# Patient Record
(demographics unavailable — no encounter records)

---

## 2025-07-07 NOTE — HEALTH RISK ASSESSMENT
[Little interest or pleasure doing things] : 1) Little interest or pleasure doing things [Feeling down, depressed, or hopeless] : 2) Feeling down, depressed, or hopeless [0] : 2) Feeling down, depressed, or hopeless: Not at all (0) [PHQ-9 Negative - No further assessment needed] : PHQ-9 Negative - No further assessment needed [Never] : Never [[id number]] : 0

## 2025-07-07 NOTE — END OF VISIT
[] : Resident [FreeTextEntry3] : 6/23 sought  for insect bite -- admitted to [facility] for olecranon bursitis and cellulitis 6/26-6/27. Still having pain when lifting over his head. He hopes to go on a golf trip this weekend. PO cefadroxil hasn't helped since discharge has been ineffective. Will do doxycycline and recommend strict sunscreen use. I recommend strictly against sports while his tendon and bursae are healing.

## 2025-07-07 NOTE — PHYSICAL EXAM
[No Acute Distress] : no acute distress [Well Nourished] : well nourished [Well Developed] : well developed [Well-Appearing] : well-appearing [PERRL] : pupils equal round and reactive to light [EOMI] : extraocular movements intact [Normal Oropharynx] : the oropharynx was normal [No Lymphadenopathy] : no lymphadenopathy [Supple] : supple [No Respiratory Distress] : no respiratory distress  [No Accessory Muscle Use] : no accessory muscle use [Clear to Auscultation] : lungs were clear to auscultation bilaterally [Normal Rate] : normal rate  [Regular Rhythm] : with a regular rhythm [Normal S1, S2] : normal S1 and S2 [No Edema] : there was no peripheral edema [Soft] : abdomen soft [Normal Gait] : normal gait [Normal Affect] : the affect was normal [Normal Insight/Judgement] : insight and judgment were intact [Grossly Normal Strength/Tone] : grossly normal strength/tone [de-identified] : left olecranon erythema, mild swelling without drainage without fluctuance

## 2025-07-07 NOTE — HEALTH RISK ASSESSMENT
[Little interest or pleasure doing things] : 1) Little interest or pleasure doing things [Feeling down, depressed, or hopeless] : 2) Feeling down, depressed, or hopeless [0] : 2) Feeling down, depressed, or hopeless: Not at all (0) [PHQ-9 Negative - No further assessment needed] : PHQ-9 Negative - No further assessment needed [Never] : Never [[id number]] : 0

## 2025-07-07 NOTE — REVIEW OF SYSTEMS
[Skin Rash] : skin rash [Fever] : no fever [Chills] : no chills [Vision Problems] : no vision problems [Chest Pain] : no chest pain [Shortness Of Breath] : no shortness of breath [Abdominal Pain] : no abdominal pain [Nausea] : no nausea [Vomiting] : no vomiting

## 2025-07-07 NOTE — HISTORY OF PRESENT ILLNESS
[Post-hospitalization from ___ Hospital] : Post-hospitalization from [unfilled] Hospital [Admitted on: ___] : The patient was admitted on [unfilled] [Discharged on ___] : discharged on [unfilled] [FreeTextEntry2] : 27 M with no PMHx presents with ongoing left elbow cellulitis. Symptoms began after what he thought was an insect bite on Monday June 23 and went to [facility] on Thursday June 26th when the elbow swelling spread to his upper left arm as well, with orthopedics evaluation and was discharged following day with 7 day course of 1 g cefadroxil. However, he feels that his elbow swelling and warmth and redness has not improved much since discharge last week. He denies systemic symptoms of fever, chills, abdominal pain, diarrhea. He has some pain with ROM of elbow when lifting weights yesterday.

## 2025-07-07 NOTE — HISTORY OF PRESENT ILLNESS
[Post-hospitalization from ___ Hospital] : Post-hospitalization from [unfilled] Hospital [Admitted on: ___] : The patient was admitted on [unfilled] [Discharged on ___] : discharged on [unfilled] [FreeTextEntry2] : 27 M with no PMHx presents with ongoing left elbow cellulitis. Symptoms began after what he thought was an insect bite on Monday June 23 and went to [facility] on Thursday June 26th when the elbow swelling spread to his upper left arm as well, with orthopedics evaluation and was discharged following day with 7 day course of 1 g cefadroxil. However, he feels that his elbow swelling and warmth and redness has not improved much since discharge last week. He denies systemic symptoms of fever, chills, abdominal pain, diarrhea. He has some pain with ROM of elbow when lifting weights yesterday.

## 2025-07-07 NOTE — PHYSICAL EXAM
[No Acute Distress] : no acute distress [Well Nourished] : well nourished [Well Developed] : well developed [Well-Appearing] : well-appearing [PERRL] : pupils equal round and reactive to light [EOMI] : extraocular movements intact [Normal Oropharynx] : the oropharynx was normal [No Lymphadenopathy] : no lymphadenopathy [Supple] : supple [No Respiratory Distress] : no respiratory distress  [No Accessory Muscle Use] : no accessory muscle use [Clear to Auscultation] : lungs were clear to auscultation bilaterally [Normal Rate] : normal rate  [Regular Rhythm] : with a regular rhythm [Normal S1, S2] : normal S1 and S2 [No Edema] : there was no peripheral edema [Soft] : abdomen soft [Normal Gait] : normal gait [Normal Affect] : the affect was normal [Normal Insight/Judgement] : insight and judgment were intact [Grossly Normal Strength/Tone] : grossly normal strength/tone [de-identified] : left olecranon erythema, mild swelling without drainage without fluctuance

## 2025-07-07 NOTE — END OF VISIT
[] : Resident [FreeTextEntry3] : 6/23 sought  for insect bite -- admitted to [facility] for olecranon bursitis and cellulitis 6/26-6/27. Still having pain when lifting over his head. He hopes to go on a golf trip this weekend. PO cefadroxil hasn't helped since discharge has been ineffective. Will do doxycycline and recommend strict sunscreen use. I recommend strictly against sports while his tendon and bursae are healing.